# Patient Record
Sex: MALE | Race: BLACK OR AFRICAN AMERICAN | URBAN - METROPOLITAN AREA
[De-identification: names, ages, dates, MRNs, and addresses within clinical notes are randomized per-mention and may not be internally consistent; named-entity substitution may affect disease eponyms.]

---

## 2017-12-10 ENCOUNTER — HOSPITAL ENCOUNTER (EMERGENCY)
Facility: CLINIC | Age: 22
Discharge: HOME | End: 2017-12-10
Attending: EMERGENCY MEDICINE | Admitting: EMERGENCY MEDICINE
Payer: COMMERCIAL

## 2017-12-10 VITALS
RESPIRATION RATE: 20 BRPM | DIASTOLIC BLOOD PRESSURE: 83 MMHG | OXYGEN SATURATION: 100 % | TEMPERATURE: 100 F | SYSTOLIC BLOOD PRESSURE: 127 MMHG | HEART RATE: 93 BPM

## 2017-12-10 DIAGNOSIS — S02.5XXB OPEN FRACTURE OF TOOTH, INITIAL ENCOUNTER: ICD-10-CM

## 2017-12-10 DIAGNOSIS — S01.511A LIP LACERATION, INITIAL ENCOUNTER: ICD-10-CM

## 2017-12-10 PROCEDURE — 12051 INTMD RPR FACE/MM 2.5 CM/<: CPT

## 2017-12-10 PROCEDURE — 90471 IMMUNIZATION ADMIN: CPT

## 2017-12-10 PROCEDURE — 90715 TDAP VACCINE 7 YRS/> IM: CPT | Performed by: EMERGENCY MEDICINE

## 2017-12-10 PROCEDURE — 99283 EMERGENCY DEPT VISIT LOW MDM: CPT | Mod: 25

## 2017-12-10 PROCEDURE — 25000128 H RX IP 250 OP 636: Performed by: EMERGENCY MEDICINE

## 2017-12-10 RX ORDER — BUPIVACAINE HYDROCHLORIDE 5 MG/ML
INJECTION, SOLUTION PERINEURAL
Status: DISCONTINUED
Start: 2017-12-10 | End: 2017-12-11 | Stop reason: HOSPADM

## 2017-12-10 RX ADMIN — CLOSTRIDIUM TETANI TOXOID ANTIGEN (FORMALDEHYDE INACTIVATED), CORYNEBACTERIUM DIPHTHERIAE TOXOID ANTIGEN (FORMALDEHYDE INACTIVATED), BORDETELLA PERTUSSIS TOXOID ANTIGEN (GLUTARALDEHYDE INACTIVATED), BORDETELLA PERTUSSIS FILAMENTOUS HEMAGGLUTININ ANTIGEN (FORMALDEHYDE INACTIVATED), BORDETELLA PERTUSSIS PERTACTIN ANTIGEN, AND BORDETELLA PERTUSSIS FIMBRIAE 2/3 ANTIGEN 0.5 ML: 5; 2; 2.5; 5; 3; 5 INJECTION, SUSPENSION INTRAMUSCULAR at 22:51

## 2017-12-10 ASSESSMENT — ENCOUNTER SYMPTOMS: WOUND: 1

## 2017-12-10 ASSESSMENT — PAIN DESCRIPTION - DESCRIPTORS: DESCRIPTORS: ACHING

## 2017-12-10 NOTE — ED AVS SNAPSHOT
[facility] Emergency Department    [address]    [facility] [address]    Phone:  [phone]    Fax:  [phone]                                       Aram Merrill   MRN: [id number]    Department:  [facility] Emergency Department   Date of Visit:  12/10/2017           Patient Information     Date Of Birth          1995        Your diagnoses for this visit were:     Lip laceration, initial encounter     Open fracture of tooth, initial encounter        You were seen by Edwar Wolfe MD.      Follow-up Information     Follow up with [facility]. Schedule an appointment as soon as possible for a visit in 1 week.    Why:  For wound re-check    Contact information:    [address] [facility] [phone]        Follow up with [facility]. Schedule an appointment as soon as possible for a visit in 1 day.    Contact information:    [address] [facility] [address] [phone]          Discharge Instructions         Lip or Mouth Laceration  A laceration is a cut through the skin. When the cut is on the outside of the lip, it may be closed with stitches. Cuts inside the mouth may be stitched or left open, depending on the size. When stitches are used in the mouth, they are usually the kind that dissolve on their own.  A tetanus shot may be given if you are not current on this vaccination and the object that caused the cut may lead to tetanus.    Home care    If you were given an antibiotic to prevent infection, don't stop taking this medicine until you have finished the prescribed course or the healthcare provider tells you to stop.    The healthcare provider may prescribe medicines for pain. Follow instructions for taking these medicines.     Follow the healthcare provider s instructions on how to care for the cut.    Wash your hands with soap and warm water before and after caring for  your cut. This helps prevent infection.    If the cut is inside your mouth, clean the wound by rinsing your mouth after each meal and at bedtime with a mixture of equal parts water and hydrogen peroxide. (Do not swallow!) Or, you can use a cotton swab to apply hydrogen peroxide directly onto the cut. You may also be prescribed chlorohexidine to swish and spit to keep the wound clean.    Mouth wounds can cause pain when chewing. Soft foods can help with this. If needed, use a local, over-the-counter numbing solution for pain relief, such as one for teething babies. Apply this directly to the wound with a cotton-tip swab or your clean finger.    If the wound is bandaged, leave the original bandage in place for 24 hours. Replace it if it becomes wet or dirty. After 24 hours, change it once a day or as directed.    If the cut is on the outside of the lip and stitches were used, you may shower as usual after the first 24 hours, but don't put your head under water or swim until the sutures are removed. After removing the bandage, wash the area with soap and water. Use a wet cotton swab to loosen and remove any blood or crust that forms. After cleaning, keep the wound clean and dry. Talk with your healthcare provider before applying any antibiotic ointment to the wound. You may apply an adhesive bandage or leave the wound open. Unless told otherwise, stitches on the inside of the mouth will likely dissolve on their own.  Follow-up care  Follow up with your healthcare provider, or as directed. If you have stitches that don't dissolve on their own, return as directed to have them removed.  When to seek medical advice  Call your healthcare provider right away if any of these occur:    Wound bleeding not controlled by direct pressure    Signs of infection, including increasing pain in the wound, increasing wound redness or swelling, or pus or bad odor coming from the wound    Fever of 100.4 F (38. C) or higher or as directed  by your healthcare provider    Stitches come apart or fall out     Wound edges re-open    Wound changes colors    Numbness around the wound   Date Last Reviewed: 7/1/2017 2000-2017 [employer]. [address] [facility], [place] [address]. All rights reserved. This information is not intended as a substitute for professional medical care. Always follow your healthcare professional's instructions.          Dental Trauma     See a dentist right away, even for a minor injury.     Injury to the teeth or mouth can happen due to an accident or sports injury. Dental trauma may not always seem serious. But even minor injuries can cause infection or other problems. The key to saving your smile is getting help right away.  When to go to the emergency room (ER)  Speed is crucial when it comes to most tooth trauma. The faster you're treated, the better the chances your tooth or teeth can be saved. Go to your dentist or the ER at once if:    You break one or more teeth.    You have one or more teeth knocked out. Put the tooth in a glass of cold milk and bring it with you.    A cut on your lip or tongue won't stop bleeding.    Your teeth don't fit together properly when you bite down.  Steps to saving a permanent tooth  If a permanent tooth is knocked out:    Handle the tooth by the top, not the roots.    Keep the tooth in a glass of milk or saltwater. Dissolve 1/4 teaspoon salt in 1 quart of water. This keeps the tooth from drying out.    Get medical help right away.   What to expect in the ER  Your injury will be examined. If you've lost a tooth, a dentist may be able to replant it. For the best results, this is done within an hour after your injury. In some cases, a broken tooth can also be repaired. Cuts and abrasions may be treated with cold packs and dressings. An X-ray will likely be taken to check on the extent of the damage and rule out fracture of the roots of your teeth.  Follow-up  Once you're home, call  your dentist right away if you:    Develop a fever of 100.4 F (38 C) or higher, or as directed by your dentist    Have drainage from around a repaired tooth    Have pain that worsens after 24 hours    Have continued bleeding  Date Last Reviewed: 7/16/2015 2000-2017 [employer]. [address] [place] [address]. All rights reserved. This information is not intended as a substitute for professional medical care. Always follow your healthcare professional's instructions.          24 Hour Appointment Hotline       To make an appointment at any [facility], call [phone] ([phone]). If you don't have a family doctor or clinic, we will help you find one. [facility] are conveniently located to serve the needs of you and your family.             Review of your medicines      Notice     You have not been prescribed any medications.            Orders Needing Specimen Collection     None      Pending Results     No orders found from 12/8/2017 to 12/11/2017.            Pending Culture Results     No orders found from 12/8/2017 to 12/11/2017.            Pending Results Instructions     If you had any lab results that were not finalized at the time of your Discharge, you can call the ED Lab Result RN at [phone]. You will be contacted by this team for any positive Lab results or changes in treatment. The nurses are available 7 days a week from 10A to 6:30P.  You can leave a message 24 hours per day and they will return your call.        Test Results From Your Hospital Stay               Clinical Quality Measure: Blood Pressure Screening     Your blood pressure was checked while you were in the emergency department today. The last reading we obtained was  BP: 127/83 . Please read the guidelines below about what these numbers mean and what you should do about them.  If your systolic blood pressure (the top number) is less than 120 and your diastolic blood pressure (the bottom  "number) is less than 80, then your blood pressure is normal. There is nothing more that you need to do about it.  If your systolic blood pressure (the top number) is 120-139 or your diastolic blood pressure (the bottom number) is 80-89, your blood pressure may be higher than it should be. You should have your blood pressure rechecked within a year by a primary care provider.  If your systolic blood pressure (the top number) is 140 or greater or your diastolic blood pressure (the bottom number) is 90 or greater, you may have high blood pressure. High blood pressure is treatable, but if left untreated over time it can put you at risk for heart attack, stroke, or kidney failure. You should have your blood pressure rechecked by a primary care provider within the next 4 weeks.  If your provider in the emergency department today gave you specific instructions to follow-up with your doctor or provider even sooner than that, you should follow that instruction and not wait for up to 4 weeks for your follow-up visit.        Thank you for choosing [facility]       Thank you for choosing [facility] for your care. Our goal is always to provide you with excellent care. Hearing back from our patients is one way we can continue to improve our services. Please take a few minutes to complete the written survey that you may receive in the mail after you visit with us. Thank you!        [employer] Information     [employer] lets you send messages to your doctor, view your test results, renew your prescriptions, schedule appointments and more. To sign up, go to [email or web address] . Click on \"Log in\" on the left side of the screen, which will take you to the Welcome page. Then click on \"Sign up Now\" on the right side of the page.     You will be asked to enter the access code listed below, as well as some personal information. Please follow the directions to create your username and password.     Your access code is: [email or web address]  Expires: " 3/10/2018 10:34 PM     Your access code will  in 90 days. If you need help or a new code, please call your [facility] clinic or [phone].        Care EveryWhere ID     This is your Care EveryWhere ID. This could be used by other organizations to access your [facility] medical records  [id number]        Equal Access to Services     MAGGIE RILEY : Wellington Vance, warosina ordoñez, qatanta kaalmatammy plasencia, donte friedman . So [facility] [phone].    ATENCIÓN: Si habla español, tiene a rinaldi disposición servicios gratuitos de asistencia lingüística. Llame al [phone].    We comply with applicable federal civil rights laws and [place] laws. We do not discriminate on the basis of race, color, national origin, age, disability, sex, sexual orientation, or gender identity.            After Visit Summary       This is your record. Keep this with you and show to your community pharmacist(s) and doctor(s) at your next visit.

## 2017-12-10 NOTE — ED AVS SNAPSHOT
[facility] Emergency Department    [address]    [facility] [address]    Phone:  [phone]    Fax:  [phone]                                       Aram Merrill   MRN: [id number]    Department:  [facility] Emergency Department   Date of Visit:  12/10/2017           After Visit Summary Signature Page     I have received my discharge instructions, and my questions have been answered. I have discussed any challenges I see with this plan with the nurse or doctor.    ..........................................................................................................................................  Patient/Patient Representative Signature      ..........................................................................................................................................  Patient Representative Print Name and Relationship to Patient    ..................................................               ................................................  Date                                            Time    ..........................................................................................................................................  Reviewed by Signature/Title    ...................................................              ..............................................  Date                                                            Time
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
## 2017-12-11 NOTE — ED NOTES
Patient tolerated suturing of lip. Alert and oriented. No active bleeding noted. MD in to see patient and discuss diagnosis, test results, and discharge plan. Patient meets discharge criteria. Discussed AVS with patient. Questions answered. Patient verbalized understanding. Patient reports being ready to go home. Patient discharged home with friend by car with all necessary information.
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
## 2017-12-11 NOTE — DISCHARGE INSTRUCTIONS
Lip or Mouth Laceration  A laceration is a cut through the skin. When the cut is on the outside of the lip, it may be closed with stitches. Cuts inside the mouth may be stitched or left open, depending on the size. When stitches are used in the mouth, they are usually the kind that dissolve on their own.  A tetanus shot may be given if you are not current on this vaccination and the object that caused the cut may lead to tetanus.    Home care    If you were given an antibiotic to prevent infection, don't stop taking this medicine until you have finished the prescribed course or the healthcare provider tells you to stop.    The healthcare provider may prescribe medicines for pain. Follow instructions for taking these medicines.     Follow the healthcare provider s instructions on how to care for the cut.    Wash your hands with soap and warm water before and after caring for your cut. This helps prevent infection.    If the cut is inside your mouth, clean the wound by rinsing your mouth after each meal and at bedtime with a mixture of equal parts water and hydrogen peroxide. (Do not swallow!) Or, you can use a cotton swab to apply hydrogen peroxide directly onto the cut. You may also be prescribed chlorohexidine to swish and spit to keep the wound clean.    Mouth wounds can cause pain when chewing. Soft foods can help with this. If needed, use a local, over-the-counter numbing solution for pain relief, such as one for teething babies. Apply this directly to the wound with a cotton-tip swab or your clean finger.    If the wound is bandaged, leave the original bandage in place for 24 hours. Replace it if it becomes wet or dirty. After 24 hours, change it once a day or as directed.    If the cut is on the outside of the lip and stitches were used, you may shower as usual after the first 24 hours, but don't put your head under water or swim until the sutures are removed. After removing the bandage, wash the area with  soap and water. Use a wet cotton swab to loosen and remove any blood or crust that forms. After cleaning, keep the wound clean and dry. Talk with your healthcare provider before applying any antibiotic ointment to the wound. You may apply an adhesive bandage or leave the wound open. Unless told otherwise, stitches on the inside of the mouth will likely dissolve on their own.  Follow-up care  Follow up with your healthcare provider, or as directed. If you have stitches that don't dissolve on their own, return as directed to have them removed.  When to seek medical advice  Call your healthcare provider right away if any of these occur:    Wound bleeding not controlled by direct pressure    Signs of infection, including increasing pain in the wound, increasing wound redness or swelling, or pus or bad odor coming from the wound    Fever of 100.4 F (38. C) or higher or as directed by your healthcare provider    Stitches come apart or fall out     Wound edges re-open    Wound changes colors    Numbness around the wound   Date Last Reviewed: 7/1/2017 2000-2017 The [employer]. [address] [place] [address]. All rights reserved. This information is not intended as a substitute for professional medical care. Always follow your healthcare professional's instructions.          Dental Trauma     See a dentist right away, even for a minor injury.     Injury to the teeth or mouth can happen due to an accident or sports injury. Dental trauma may not always seem serious. But even minor injuries can cause infection or other problems. The key to saving your smile is getting help right away.  When to go to the emergency room (ER)  Speed is crucial when it comes to most tooth trauma. The faster you're treated, the better the chances your tooth or teeth can be saved. Go to your dentist or the ER at once if:    You break one or more teeth.    You have one or more teeth knocked out. Put the tooth in a glass of  cold milk and bring it with you.    A cut on your lip or tongue won't stop bleeding.    Your teeth don't fit together properly when you bite down.  Steps to saving a permanent tooth  If a permanent tooth is knocked out:    Handle the tooth by the top, not the roots.    Keep the tooth in a glass of milk or saltwater. Dissolve 1/4 teaspoon salt in 1 quart of water. This keeps the tooth from drying out.    Get medical help right away.   What to expect in the ER  Your injury will be examined. If you've lost a tooth, a dentist may be able to replant it. For the best results, this is done within an hour after your injury. In some cases, a broken tooth can also be repaired. Cuts and abrasions may be treated with cold packs and dressings. An X-ray will likely be taken to check on the extent of the damage and rule out fracture of the roots of your teeth.  Follow-up  Once you're home, call your dentist right away if you:    Develop a fever of 100.4 F (38 C) or higher, or as directed by your dentist    Have drainage from around a repaired tooth    Have pain that worsens after 24 hours    Have continued bleeding  Date Last Reviewed: 7/16/2015 2000-2017 [employer]. [address] [place] [address], [place] [address]. All rights reserved. This information is not intended as a substitute for professional medical care. Always follow your healthcare professional's instructions.